# Patient Record
Sex: FEMALE | Race: WHITE
[De-identification: names, ages, dates, MRNs, and addresses within clinical notes are randomized per-mention and may not be internally consistent; named-entity substitution may affect disease eponyms.]

---

## 2023-08-11 ENCOUNTER — HOSPITAL ENCOUNTER (OUTPATIENT)
Dept: HOSPITAL 47 - RADUSWWP | Age: 58
Discharge: HOME | End: 2023-08-11
Attending: INTERNAL MEDICINE
Payer: COMMERCIAL

## 2023-08-11 DIAGNOSIS — K80.20: Primary | ICD-10-CM

## 2023-08-11 PROCEDURE — 76700 US EXAM ABDOM COMPLETE: CPT

## 2023-08-11 NOTE — US
EXAMINATION TYPE: US abdomen complete

 

DATE OF EXAM: 8/11/2023

 

COMPARISON: NONE

 

CLINICAL INDICATION: Female, 57 years old with history of K80.20 CALCULUS OF GALLBLADDER W/O CHOLECYS
TITIS W;

 

TECHNIQUE: Multiple sonographic images of the abdomen are obtained.

 

FINDINGS:

 

EXAM MEASUREMENTS:

 

Liver Length:  10.6 cm   

Gallbladder Wall:  0.3 cm   

CBD:  0.6 cm

Spleen:  10.0 cm   

Right Kidney:  10.4 x 3.9 x 5.4 cm 

Left Kidney:  11.7 x 5.7 x 6.2 cm   

 

SONOGRAPHER NOTES:

 

Pancreas:  wnl

Liver:  wnl  

Gallbladder:  1.4 cm mobile stone with shadowing

**Evidence for sonographic Hines's sign:  Yes

CBD:  wnl 

Spleen:  wnl   

Right Kidney:  No hydronephrosis or masses seen   

Left Kidney:  No hydronephrosis or masses seen   

Upper IVC:  wnl  

Abd Aorta:  wnl

 

 

 

The liver is homogenous.  The intrahepatic portion of the IVC and proximal abdominal aorta are within
 normal limits.  Common bile duct is unremarkable.  The visualized portions of the pancreas are homog
enous.  The spleen is unremarkable.  Kidneys are symmetric and free of hydronephrosis.  No renal lesi
ons are seen.

 

IMPRESSION: 

Uncomplicated cholelithiasis.

## 2023-09-01 ENCOUNTER — HOSPITAL ENCOUNTER (OUTPATIENT)
Dept: HOSPITAL 47 - RADMAMWWP | Age: 58
Discharge: HOME | End: 2023-09-01
Attending: INTERNAL MEDICINE
Payer: COMMERCIAL

## 2023-09-01 DIAGNOSIS — Z12.31: Primary | ICD-10-CM

## 2023-09-01 DIAGNOSIS — Z78.0: ICD-10-CM

## 2023-09-01 PROCEDURE — 77067 SCR MAMMO BI INCL CAD: CPT

## 2023-09-06 NOTE — MM
Reason for Exam: Screening  (asymptomatic). 

Last mammogram was performed 8 year(s) and 3 month(s) ago. 





Patient History: 

Menarche at age 13. First Full-Term Pregnancy at age 28. Postmenopausal. 





Risk Values: 

Gretchen 5 year model risk: 1.4%.

NCI Lifetime model risk: 9.9%.





Prior Study Comparison: 

3/6/2013 Bilateral Diagnostic Mammogram, Malcolm Bagley. 3/6/2013 Right Diagnostic Ultrasound,

Malcolm Bagley. 4/24/2014 Bilateral Diagnostic Mammogram, Malcolm Bagley. 4/24/2014 Right Diagnostic

Ultrasound, Malcolm Bagley. 6/18/2015 Bilateral Diagnostic Mammogram, Malcolm Bagley. 6/18/2015

Right Diagnostic Ultrasound, Malcolm Bagley. 





Tissue Density: 

The breast tissue is heterogeneously dense. This may lower the sensitivity of mammography.





Findings: 

Analyzed By CAD. 

There is no suspicious group of microcalcifications or new suspicious mass in either breast. Stable

chronic nodularity within the right breast. 





Overall Assessment: Benign, BI-RAD 2





Management: 

Screening Mammogram of both breasts in 1 year.

A clinical breast exam by your physician is recommended on an annual basis and results should be

correlated with mammographic findings.



Note on Gretchen scores and lifetime risk:

1. A Gretchen score greater than 3% is considered moderate risk. If this is the case, consider

specialist referral to assess eligibility for a risk reducing agent.

If overall lifetime risk for the development of breast cancer is 20% or higher, the patient may

qualify for future screening with alternating mammogram and breast MRI.



Electronically signed and approved by: Dax Smalls D.O.